# Patient Record
Sex: MALE | Race: WHITE
[De-identification: names, ages, dates, MRNs, and addresses within clinical notes are randomized per-mention and may not be internally consistent; named-entity substitution may affect disease eponyms.]

---

## 2018-03-29 ENCOUNTER — HOSPITAL ENCOUNTER (OUTPATIENT)
Dept: HOSPITAL 88 - CT | Age: 68
End: 2018-03-29
Attending: FAMILY MEDICINE
Payer: MEDICARE

## 2018-03-29 DIAGNOSIS — R93.8: Primary | ICD-10-CM

## 2018-03-29 LAB
BUN SERPL-MCNC: 12 MG/DL (ref 7–26)
BUN/CREAT SERPL: 17 (ref 6–25)
EGFRCR SERPLBLD CKD-EPI 2021: > 60 ML/MIN (ref 60–?)

## 2018-03-29 PROCEDURE — 71260 CT THORAX DX C+: CPT

## 2018-03-29 PROCEDURE — 82565 ASSAY OF CREATININE: CPT

## 2018-03-29 PROCEDURE — 36415 COLL VENOUS BLD VENIPUNCTURE: CPT

## 2018-03-29 PROCEDURE — 84520 ASSAY OF UREA NITROGEN: CPT

## 2018-03-29 NOTE — DIAGNOSTIC IMAGING REPORT
PROCEDURE:

CT scan of the chest  WITH intravenous contrast, using standard 

protocol.

 

TECHNIQUE:

The chest was scanned utilizing a multidetector helical scanner from 

the lung apex through the level of the adrenal glands after the IV 

administration of 100 cc of Isovue 370.  Coronal and sagittal 

multiplanar reformations were obtained.

 

COMPARISON: None.

 

INDICATIONS:  ABNORMAL CHEST XRAY, short of breath 

    

FINDINGS:

Lines/tubes:  None.

 

Lungs and Airways:  The lungs and airways are normal with no focal 

abnormality demonstrated. 

 

Pleura: The pleural spaces are clear.

 

Heart and mediastinum: The thyroid gland is normal. No significant 

mediastinal, hilar or axillary lymphadenopathy is seen. The heart and 

pericardium are within normal limits. Severe tri vessel coronary artery 

calcifications.

 

Soft tissues: Normal.

 

Abdomen: Limited contrast-enhanced views of the upper abdomen show no 

abnormality within the visualized liver, spleen, pancreas, or kidneys. 

The adrenal glands are normal. A 1.4 cm cyst in segment 4A of the 

liver. 

 

Bones: The visualized bony thorax is within normal limits. 

 

IMPRESSION: 

 

1. Unremarkable chest CT.

2. After discussing with the ordering provider, there was widening of 

the upper mediastinum and prominence of the right hilar region on chest 

xray. This corresponds to fatty proliferation in the mediastinum and 

normal hilar vasculature.

 

 

 

Dictated by:  Riaz Drake M.D. on 3/29/2018 at 13:02     

Electronically approved by:  Riaz Drake M.D. on 3/29/2018 at 13:02

## 2019-06-24 ENCOUNTER — HOSPITAL ENCOUNTER (OUTPATIENT)
Dept: HOSPITAL 88 - CT | Age: 69
End: 2019-06-24
Attending: FAMILY MEDICINE
Payer: MEDICARE

## 2019-06-24 DIAGNOSIS — M43.22: Primary | ICD-10-CM

## 2019-06-24 PROCEDURE — 72125 CT NECK SPINE W/O DYE: CPT

## 2019-06-24 NOTE — DIAGNOSTIC IMAGING REPORT
******** ADDENDUM #1 ********



I agree with Dr. Cabezas's report with minor changes in the degenerative

section:.



1.  The discs are degenerated and partially fused from C2 to C5, moderately

degenerated from C5 -T1. Superimposed confluent anterior osteophytes from C2 to

C5 are consistent with DISH

2.  Moderate to severe degenerative spinal canal stenosis from C2 to C4, and

from C5 to C7 is due to disc osteophyte complexes and calcifications of the

PLL.

3.  Superimposed degenerative foraminal stenosis is mild left at C3-4, mild

bilaterally at C5-6, moderate right, severe left at C6-7 due to facet and

uncoarthrosis.





Final report provided by Dr. Marquez on 6/24/2019 at 1917 hours



Signed by: Dr. Félix Marquez M.D. on 6/24/2019 7:18 PM

******** ORIGINAL REPORT ********



Exam: Noncontrast C-spine CT



History: Cervical spine ankylosis

Comparison studies: None



Technique: 

Axial images were obtained through the cervical region.

Coronal and sagittal images reconstructed from the axial data.

Dose modulation, iterative reconstruction, and/or weight based adjustment 

of the mA/kV was utilized to reduce the radiation dose to as low as reasonably 

achievable.



Intravenous contrast: None



Findings:



Airway: Patent.



Atlantoaxial articulation: Intact

Alignment: Normal lordosis No scoliosis.

Cervicomedullary junction: No abnormalities. Patent foramen magnum.  

Soft tissues: No gross abnormalities. 



Vertebrae: 

No fractures, neoplasm or infection.



Degenerative changes:



Large flowing anterior osteophytes ankylosis vertebral bodies C2-C5 with

smaller anterior osteophyte osteophytes bridging C5-6 and C6-7. The posterior

longitudinal ligament is ossified from C2-3 through C6 with resultant mild

narrowing of the central canal. Overall this results in ankylosis from C2-3

through C6-7 with severe degenerative disc disease at C7-T1 with endplate

sclerosis and moderate bilateral facet arthrosis. Mild mild bilateral foraminal

stenosis at C6-7.



IMPRESSION:



1.  Degenerative changes of DISH from C2 through C5 with smaller bridging

anterior osteophytes C5-C7 resulting in ankylosis from C2 through C7 and severe

disc degenerative changes at C7-T1. No evidence of fracture.



2.  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



Preliminary report was provided by the neuroradiology fellow. Final read to

follow.



Signed by: Carlos Cabezas MD on 6/24/2019 4:36 PM